# Patient Record
Sex: FEMALE | Race: WHITE | NOT HISPANIC OR LATINO | Employment: OTHER | URBAN - METROPOLITAN AREA
[De-identification: names, ages, dates, MRNs, and addresses within clinical notes are randomized per-mention and may not be internally consistent; named-entity substitution may affect disease eponyms.]

---

## 2023-11-02 ENCOUNTER — SOCIAL WORK (OUTPATIENT)
Dept: CASE MANAGEMENT | Facility: HOSPITAL | Age: 88
End: 2023-11-02
Payer: MEDICARE

## 2023-11-02 NOTE — PROGRESS NOTES
Social Work Note  11/2/2023 SW was requested by Dr. Garnica to talk with patient's son as he needed assistance with patient's disposition from hospice.  SW contacted son yesterday and reviewed the options for discharge from residential hospice to the community with hospice.  SW also encouraged him to have the social workers from both hospice and assisted living where his father is living to talk.  SW provided son with this writer's contact information.  SW will remain available to assist patient.  Christy Corbett, MSW, LSW

## 2023-11-13 ENCOUNTER — APPOINTMENT (OUTPATIENT)
Dept: RADIOLOGY | Facility: HOSPITAL | Age: 88
End: 2023-11-13
Payer: MEDICARE

## 2023-11-13 ENCOUNTER — HOSPITAL ENCOUNTER (EMERGENCY)
Facility: HOSPITAL | Age: 88
Discharge: HOSPICE | End: 2023-11-13
Attending: STUDENT IN AN ORGANIZED HEALTH CARE EDUCATION/TRAINING PROGRAM
Payer: MEDICARE

## 2023-11-13 VITALS
BODY MASS INDEX: 26.01 KG/M2 | DIASTOLIC BLOOD PRESSURE: 72 MMHG | RESPIRATION RATE: 16 BRPM | TEMPERATURE: 98.6 F | HEART RATE: 89 BPM | OXYGEN SATURATION: 99 % | SYSTOLIC BLOOD PRESSURE: 128 MMHG | WEIGHT: 137.79 LBS | HEIGHT: 61 IN
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
## 2023-11-13 DIAGNOSIS — S62.102A CLOSED FRACTURE OF LEFT WRIST, INITIAL ENCOUNTER: ICD-10-CM

## 2023-11-13 LAB
ALBUMIN SERPL BCP-MCNC: 3.1 G/DL (ref 3.4–5)
ALP SERPL-CCNC: 62 U/L (ref 33–136)
ALT SERPL W P-5'-P-CCNC: 8 U/L (ref 7–45)
ANION GAP SERPL CALC-SCNC: 11 MMOL/L (ref 10–20)
APPEARANCE UR: ABNORMAL
AST SERPL W P-5'-P-CCNC: 18 U/L (ref 9–39)
BASOPHILS # BLD AUTO: 0.04 X10*3/UL (ref 0–0.1)
BASOPHILS NFR BLD AUTO: 0.5 %
BILIRUB SERPL-MCNC: 0.8 MG/DL (ref 0–1.2)
BILIRUB UR STRIP.AUTO-MCNC: NEGATIVE MG/DL
BUN SERPL-MCNC: 9 MG/DL (ref 6–23)
CALCIUM SERPL-MCNC: 8.4 MG/DL (ref 8.6–10.3)
CARDIAC TROPONIN I PNL SERPL HS: 17 NG/L (ref 0–13)
CHLORIDE SERPL-SCNC: 97 MMOL/L (ref 98–107)
CO2 SERPL-SCNC: 30 MMOL/L (ref 21–32)
COLOR UR: ABNORMAL
CREAT SERPL-MCNC: 1.16 MG/DL (ref 0.5–1.05)
EOSINOPHIL # BLD AUTO: 0.14 X10*3/UL (ref 0–0.4)
EOSINOPHIL NFR BLD AUTO: 1.8 %
ERYTHROCYTE [DISTWIDTH] IN BLOOD BY AUTOMATED COUNT: 15.2 % (ref 11.5–14.5)
GFR SERPL CREATININE-BSD FRML MDRD: 45 ML/MIN/1.73M*2
GLUCOSE SERPL-MCNC: 101 MG/DL (ref 74–99)
GLUCOSE UR STRIP.AUTO-MCNC: NEGATIVE MG/DL
HCT VFR BLD AUTO: 38 % (ref 36–46)
HGB BLD-MCNC: 12.1 G/DL (ref 12–16)
HOLD SPECIMEN: NORMAL
IMM GRANULOCYTES # BLD AUTO: 0.03 X10*3/UL (ref 0–0.5)
IMM GRANULOCYTES NFR BLD AUTO: 0.4 % (ref 0–0.9)
KETONES UR STRIP.AUTO-MCNC: NEGATIVE MG/DL
LEUKOCYTE ESTERASE UR QL STRIP.AUTO: NEGATIVE
LYMPHOCYTES # BLD AUTO: 0.94 X10*3/UL (ref 0.8–3)
LYMPHOCYTES NFR BLD AUTO: 12.2 %
MCH RBC QN AUTO: 26.1 PG (ref 26–34)
MCHC RBC AUTO-ENTMCNC: 31.8 G/DL (ref 32–36)
MCV RBC AUTO: 82 FL (ref 80–100)
MONOCYTES # BLD AUTO: 0.62 X10*3/UL (ref 0.05–0.8)
MONOCYTES NFR BLD AUTO: 8.1 %
NEUTROPHILS # BLD AUTO: 5.92 X10*3/UL (ref 1.6–5.5)
NEUTROPHILS NFR BLD AUTO: 77 %
NITRITE UR QL STRIP.AUTO: NEGATIVE
NRBC BLD-RTO: 0 /100 WBCS (ref 0–0)
PH UR STRIP.AUTO: 5 [PH]
PLATELET # BLD AUTO: 148 X10*3/UL (ref 150–450)
POTASSIUM SERPL-SCNC: 3.9 MMOL/L (ref 3.5–5.3)
PROT SERPL-MCNC: 5.7 G/DL (ref 6.4–8.2)
PROT UR STRIP.AUTO-MCNC: ABNORMAL MG/DL
RBC # BLD AUTO: 4.64 X10*6/UL (ref 4–5.2)
RBC # UR STRIP.AUTO: NEGATIVE /UL
RBC #/AREA URNS AUTO: NORMAL /HPF
SODIUM SERPL-SCNC: 134 MMOL/L (ref 136–145)
SP GR UR STRIP.AUTO: 1.02
SQUAMOUS #/AREA URNS AUTO: NORMAL /HPF
UROBILINOGEN UR STRIP.AUTO-MCNC: <2 MG/DL
WBC # BLD AUTO: 7.7 X10*3/UL (ref 4.4–11.3)
WBC #/AREA URNS AUTO: NORMAL /HPF

## 2023-11-13 PROCEDURE — 70450 CT HEAD/BRAIN W/O DYE: CPT | Performed by: RADIOLOGY

## 2023-11-13 PROCEDURE — 73110 X-RAY EXAM OF WRIST: CPT | Mod: LEFT SIDE | Performed by: RADIOLOGY

## 2023-11-13 PROCEDURE — 29125 APPL SHORT ARM SPLINT STATIC: CPT | Mod: LT

## 2023-11-13 PROCEDURE — 96374 THER/PROPH/DIAG INJ IV PUSH: CPT | Mod: 59

## 2023-11-13 PROCEDURE — 93010 ELECTROCARDIOGRAM REPORT: CPT | Performed by: STUDENT IN AN ORGANIZED HEALTH CARE EDUCATION/TRAINING PROGRAM

## 2023-11-13 PROCEDURE — 84075 ASSAY ALKALINE PHOSPHATASE: CPT | Performed by: STUDENT IN AN ORGANIZED HEALTH CARE EDUCATION/TRAINING PROGRAM

## 2023-11-13 PROCEDURE — 73110 X-RAY EXAM OF WRIST: CPT | Mod: LT,FR

## 2023-11-13 PROCEDURE — 85025 COMPLETE CBC W/AUTO DIFF WBC: CPT | Performed by: STUDENT IN AN ORGANIZED HEALTH CARE EDUCATION/TRAINING PROGRAM

## 2023-11-13 PROCEDURE — 2500000004 HC RX 250 GENERAL PHARMACY W/ HCPCS (ALT 636 FOR OP/ED): Performed by: STUDENT IN AN ORGANIZED HEALTH CARE EDUCATION/TRAINING PROGRAM

## 2023-11-13 PROCEDURE — 72125 CT NECK SPINE W/O DYE: CPT | Performed by: RADIOLOGY

## 2023-11-13 PROCEDURE — 99285 EMERGENCY DEPT VISIT HI MDM: CPT | Performed by: STUDENT IN AN ORGANIZED HEALTH CARE EDUCATION/TRAINING PROGRAM

## 2023-11-13 PROCEDURE — 70450 CT HEAD/BRAIN W/O DYE: CPT

## 2023-11-13 PROCEDURE — 36415 COLL VENOUS BLD VENIPUNCTURE: CPT | Performed by: STUDENT IN AN ORGANIZED HEALTH CARE EDUCATION/TRAINING PROGRAM

## 2023-11-13 PROCEDURE — 81001 URINALYSIS AUTO W/SCOPE: CPT | Performed by: STUDENT IN AN ORGANIZED HEALTH CARE EDUCATION/TRAINING PROGRAM

## 2023-11-13 PROCEDURE — 99285 EMERGENCY DEPT VISIT HI MDM: CPT | Mod: 25 | Performed by: STUDENT IN AN ORGANIZED HEALTH CARE EDUCATION/TRAINING PROGRAM

## 2023-11-13 PROCEDURE — 72125 CT NECK SPINE W/O DYE: CPT

## 2023-11-13 PROCEDURE — 84484 ASSAY OF TROPONIN QUANT: CPT | Performed by: STUDENT IN AN ORGANIZED HEALTH CARE EDUCATION/TRAINING PROGRAM

## 2023-11-13 RX ORDER — OXYCODONE HYDROCHLORIDE 5 MG/1
5 TABLET ORAL EVERY 6 HOURS PRN
Qty: 15 TABLET | Refills: 0 | Status: SHIPPED | OUTPATIENT
Start: 2023-11-13 | End: 2023-11-20

## 2023-11-13 RX ORDER — FENTANYL CITRATE 50 UG/ML
25 INJECTION, SOLUTION INTRAMUSCULAR; INTRAVENOUS ONCE
Status: COMPLETED | OUTPATIENT
Start: 2023-11-13 | End: 2023-11-13

## 2023-11-13 RX ADMIN — FENTANYL CITRATE 25 MCG: 50 INJECTION, SOLUTION INTRAMUSCULAR; INTRAVENOUS at 11:55

## 2023-11-13 ASSESSMENT — PAIN SCALES - GENERAL
PAINLEVEL_OUTOF10: 2
PAINLEVEL_OUTOF10: 5 - MODERATE PAIN
PAINLEVEL_OUTOF10: 2
PAINLEVEL_OUTOF10: 3
PAINLEVEL_OUTOF10: 5 - MODERATE PAIN
PAINLEVEL_OUTOF10: 4
PAINLEVEL_OUTOF10: 2
PAINLEVEL_OUTOF10: 2

## 2023-11-13 ASSESSMENT — PAIN DESCRIPTION - LOCATION: LOCATION: WRIST

## 2023-11-13 ASSESSMENT — LIFESTYLE VARIABLES
EVER FELT BAD OR GUILTY ABOUT YOUR DRINKING: NO
EVER HAD A DRINK FIRST THING IN THE MORNING TO STEADY YOUR NERVES TO GET RID OF A HANGOVER: NO
HAVE YOU EVER FELT YOU SHOULD CUT DOWN ON YOUR DRINKING: NO
HAVE PEOPLE ANNOYED YOU BY CRITICIZING YOUR DRINKING: NO
REASON UNABLE TO ASSESS: NO

## 2023-11-13 ASSESSMENT — COLUMBIA-SUICIDE SEVERITY RATING SCALE - C-SSRS
2. HAVE YOU ACTUALLY HAD ANY THOUGHTS OF KILLING YOURSELF?: NO
1. IN THE PAST MONTH, HAVE YOU WISHED YOU WERE DEAD OR WISHED YOU COULD GO TO SLEEP AND NOT WAKE UP?: NO
6. HAVE YOU EVER DONE ANYTHING, STARTED TO DO ANYTHING, OR PREPARED TO DO ANYTHING TO END YOUR LIFE?: NO

## 2023-11-13 ASSESSMENT — PAIN - FUNCTIONAL ASSESSMENT
PAIN_FUNCTIONAL_ASSESSMENT: 0-10
PAIN_FUNCTIONAL_ASSESSMENT: 0-10

## 2023-11-13 ASSESSMENT — PAIN DESCRIPTION - ORIENTATION: ORIENTATION: LEFT

## 2023-11-13 NOTE — ED PROVIDER NOTES
HPI   Chief Complaint   Patient presents with    Fall       This is a 89-year-old female who presents to the emergency department from facility after a fall.  She has a history of known non-small cell lung cancer, and A-fib RVR who has been on hospice but has been doing so well that she is stating her last day on hospice is today.  She was transferring with assistance, when she fell.  Complaining of left wrist pain.  She did not lose consciousness.  She was on a blood thinner for her A-fib with RVR up until beginning hospice, but has not been on a blood thinner since then.  Denies losing consciousness, and denies nausea or vomiting.                          Wilmer Coma Scale Score: 15                  Patient History   Past Medical History:   Diagnosis Date    Personal history of other diseases of the musculoskeletal system and connective tissue     History of arthritis    Personal history of other diseases of urinary system     History of chronic kidney disease    Personal history of other endocrine, nutritional and metabolic disease     History of hypothyroidism    Personal history of other infectious and parasitic diseases     History of herpes zoster     Past Surgical History:   Procedure Laterality Date    OTHER SURGICAL HISTORY  06/10/2019    Cataract surgery    OTHER SURGICAL HISTORY  06/10/2019    Tonsillectomy    OTHER SURGICAL HISTORY  09/07/2021    Cardiac catheterization with stent placement     No family history on file.  Social History     Tobacco Use    Smoking status: Not on file    Smokeless tobacco: Not on file   Substance Use Topics    Alcohol use: Not on file    Drug use: Not on file       Physical Exam   ED Triage Vitals [11/13/23 1115]   Temp Heart Rate Resp BP   36.8 °C (98.2 °F) (!) 130 18 (!) 130/93      SpO2 Temp Source Heart Rate Source Patient Position   99 % Temporal Monitor --      BP Location FiO2 (%)     -- --       Physical Exam  Vitals and nursing note reviewed.   Constitutional:        General: She is not in acute distress.     Appearance: She is well-developed.      Comments: Frail elderly female   HENT:      Head: Normocephalic and atraumatic.   Eyes:      Conjunctiva/sclera: Conjunctivae normal.   Cardiovascular:      Rate and Rhythm: Normal rate and regular rhythm.      Heart sounds: No murmur heard.  Pulmonary:      Effort: Pulmonary effort is normal. No respiratory distress.      Breath sounds: Normal breath sounds.   Abdominal:      Palpations: Abdomen is soft.      Tenderness: There is no abdominal tenderness.   Musculoskeletal:         General: Deformity (Left wrist) present. No swelling.      Cervical back: Neck supple.   Skin:     General: Skin is warm and dry.      Capillary Refill: Capillary refill takes less than 2 seconds.   Neurological:      Mental Status: She is alert.   Psychiatric:         Mood and Affect: Mood normal.       ED Course & MDM   ED Course as of 11/14/23 0908 Mon Nov 13, 2023   1253 EKG independently interpreted by attending physician    1229 hrs.: Atrial fibrillation with RVR ventricular to 139 bpm.  QTc 523.  QRS 80.  Nonspecific ST and T wave abnormalities.  No acute injury pattern seen. [AI]      ED Course User Index  [AI] Zarina Cummings, DO         Diagnoses as of 11/14/23 0908   Fall, initial encounter   Closed fracture of left wrist, initial encounter       Medical Decision Making  History obtained from: Patient and EMS reports and chart review    External records reviewed: I reviewed external records including outpatient, PCP records, and prior discharge summaries    ED Course: Mild left wrist deformity, pulses and capillary refill intact in the hand.  Patient maintained in a c-collar.  Scan her head and C-spine, send basic labs and EKG. head and C-spine CTs show no acute fracture or dislocation.  C-spine cleared.  Left right wrist x-ray shows impacted fracture.  Placed in a sugar-tong splint.  She was in A-fib RVR, initially in the 130s up to 140s,  and there were some discussions regarding whether or not this patient was on hospice, the patient stated that today was her last day at the hospice facility, clarified with her son that she is moving from [facility] back to the assisted living facility where her 's house but that she will remain under hospice care.  They opted not to treat the Afib with RVR anymore. She is therefore discharged back to her facility with instructions to follow-up outpatient with orthopedic surgery for casting in the next several days.     I have reviewed this case with the ED attending physician, and the attending agrees with the plan. Patient or family was counselled regarding labs, imaging, likely diagnosis, and plan. All questions were answered.     Lalita Loera DO  PGY-4, emergency medicine    The above documentation was completed with the use of speech recognition software. It may contain dictation errors secondary to limitations of the software.    EKG obtained at 1229 and independently interpreted by ED provider shows atrial fibrillation with RVR, with normal axis, QRS of 80 ms, and QTc of 523.  Heart rate is in the 130s heart rate 139.  No signs of ischemia.  EKG was repeated at 1448, and independently interpreted by ED provider shows atrial fibrillation with RVR, heart rate 121, normal axis, QRS of 90 ms, QTc 491.  No signs of ischemia.    Sodium is 134 and her chloride is 97, creatinine slightly elevated at 1.16, no leukocytosis or signs of anemia, and her platelet count is 148.  She is urinalysis, with 2+ protein, but she no signs of nitrites or leukocyte esterase, or other findings concerning for UTI    Wrist fracture left distal radius identified on x-rays, and the left upper extremity placed in sugar-tong splint.           Procedure  Procedures     Lalita Loera DO  Resident  11/14/23 1578    The patient was seen by the resident/fellow.  I have personally performed a substantive portion of the encounter.  I  have seen and examined the patient; agree with the workup, evaluation, MDM, management and diagnosis.  The care plan has been discussed with the resident; I have reviewed the resident’s note and agree with the documented findings.           Zarina Cummings DO  11/14/23 1956

## 2023-11-13 NOTE — DISCHARGE INSTRUCTIONS
If you develop fevers, if the limb becomes hard, hot, swollen, red, or if it becomes cold and white or if you lose feeling, or if you become unable to move it, or if you develop tracking redness of the limb, please return to the emergency department immediately for reevaluation.    Otherwise please follow-up outpatient with the orthopedic surgeon in 3 days for a cast.

## 2023-11-13 NOTE — ED NOTES
Report called to Fiona at [facility]. Pt transport being set up for pt to return to them. Medic splinted arm.     Luciana Humphries RN  11/13/23 6472

## 2023-11-15 ENCOUNTER — HOSPITAL ENCOUNTER (OUTPATIENT)
Dept: CARDIOLOGY | Facility: HOSPITAL | Age: 88
Discharge: HOME | End: 2023-11-15
Payer: MEDICARE

## 2023-11-15 LAB
ATRIAL RATE: 119 BPM
ATRIAL RATE: 159 BPM
Q ONSET: 225 MS
Q ONSET: 225 MS
QRS COUNT: 20 BEATS
QRS COUNT: 23 BEATS
QRS DURATION: 80 MS
QRS DURATION: 90 MS
QT INTERVAL: 344 MS
QT INTERVAL: 346 MS
QTC CALCULATION(BAZETT): 491 MS
QTC CALCULATION(BAZETT): 523 MS
QTC FREDERICIA: 437 MS
QTC FREDERICIA: 455 MS
R AXIS: 43 DEGREES
R AXIS: 61 DEGREES
T AXIS: -67 DEGREES
T AXIS: 34 DEGREES
T OFFSET: 397 MS
T OFFSET: 398 MS
VENTRICULAR RATE: 121 BPM
VENTRICULAR RATE: 139 BPM

## 2023-11-15 PROCEDURE — 93005 ELECTROCARDIOGRAM TRACING: CPT

## 2023-11-16 ENCOUNTER — OFFICE VISIT (OUTPATIENT)
Dept: ORTHOPEDIC SURGERY | Facility: CLINIC | Age: 88
End: 2023-11-16
Payer: MEDICARE

## 2023-11-16 DIAGNOSIS — M25.532 LEFT WRIST PAIN: ICD-10-CM

## 2023-11-16 DIAGNOSIS — S52.502A CLOSED FRACTURE OF DISTAL END OF LEFT RADIUS, UNSPECIFIED FRACTURE MORPHOLOGY, INITIAL ENCOUNTER: ICD-10-CM

## 2023-11-16 PROCEDURE — L3984 UPPER EXT FX ORTHOSIS WRIST: HCPCS | Performed by: FAMILY MEDICINE

## 2023-11-16 NOTE — PROGRESS NOTES
Acute Injury New Patient Visit    CC:   Chief Complaint   Patient presents with    Left Wrist - Pain     Lt wrist comminuted fracture from fall 11/13/23  Xrays at        HPI: Kimberlyn is a 89 y.o.female who presents today with new complaints of left wrist pain status post a fall approximately 3 days ago.  She was seen evaluated in the emergency department where she was found to have a nondisplaced comminuted left distal radius fracture.  She is currently in hospice has a history of what I believed to be his lung cancer or bronchial cancer.  She is hoping to go home with home hospice in the relatively short future.  This injury has delayed her going home.        Review of Systems   GENERAL: Negative for malaise, significant weight loss, fever  MUSCULOSKELETAL: See HPI  NEURO: Negative for numbness / tingling     Past Medical History  Past Medical History:   Diagnosis Date    Personal history of other diseases of the musculoskeletal system and connective tissue     History of arthritis    Personal history of other diseases of urinary system     History of chronic kidney disease    Personal history of other endocrine, nutritional and metabolic disease     History of hypothyroidism    Personal history of other infectious and parasitic diseases     History of herpes zoster       Medication review  Medication Documentation Review Audit       Reviewed by Julia Antonio MA (Technologist) on 11/16/23 at 0837      Medication Order Taking? Sig Documenting Provider Last Dose Status   oxyCODONE (Roxicodone) 5 mg immediate release tablet [id number]  Take 1 tablet (5 mg) by mouth every 6 hours if needed for severe pain (7 - 10) for up to 7 days. Lalita Loera, DO  Active                    Allergies  Allergies   Allergen Reactions    Acetaminophen Unknown    Famotidine Other and Unknown    Hydrocodone Unknown    Hydrocodone-Acetaminophen Unknown    Oxybutynin Unknown    Tramadol Unknown    Ace Inhibitors Rash    Omeprazole Rash  and Unknown    Sulfamethoxazole-Trimethoprim Rash       Social History  Social History     Socioeconomic History    Marital status:      Spouse name: Not on file    Number of children: Not on file    Years of education: Not on file    Highest education level: Not on file   Occupational History    Not on file   Tobacco Use    Smoking status: Never    Smokeless tobacco: Never   Substance and Sexual Activity    Alcohol use: Not on file    Drug use: Not on file    Sexual activity: Not on file   Other Topics Concern    Not on file   Social History Narrative    Not on file     Social Determinants of Health     Financial Resource Strain: Not on file   Food Insecurity: Not on file   Transportation Needs: Not on file   Physical Activity: Not on file   Stress: Not on file   Social Connections: Not on file   Intimate Partner Violence: Not on file   Housing Stability: Not on file       Surgical History  Past Surgical History:   Procedure Laterality Date    OTHER SURGICAL HISTORY  06/10/2019    Cataract surgery    OTHER SURGICAL HISTORY  06/10/2019    Tonsillectomy    OTHER SURGICAL HISTORY  09/07/2021    Cardiac catheterization with stent placement       Physical Exam:  GENERAL:  Patient is awake, alert, and oriented to person place and time.  Patient appears well nourished and well kept.  Affect Calm, Not Acutely Distressed.  HEENT:  Normocephalic, Atraumatic, EOMI  CARDIOVASCULAR:  Hemodynamically stable.  RESPIRATORY:  Normal respirations with unlabored breathing.  NEURO: Neuro  Extremity: Left wrist exam demonstrates skin which is warm pink well-perfused tenderness palpation over the distal radius no ulnar-sided pain she can give a thumbs up and okay sign limited wrist flexion wrist extension.  Forearm compartment soft compressible elbow is nontender.  No open cuts wounds or sores.  No redness or erythema.  No snuffbox pain.      Diagnostics: X-rays reviewed consistent with nondisplaced comminuted fracture as  discussed with the patient.           Procedure: None  Procedures    Assessment:   Problem List Items Addressed This Visit    None  Visit Diagnoses       Left wrist pain        Closed fracture of distal end of left radius, unspecified fracture morphology, initial encounter        Relevant Orders    Exos Short Arm Fx Brace             Plan: Discussed the nonoperative nature of the injury with the patient at the bedside.  She will be provided with a removable fracture brace today.  We discussed not providing her with a cast due to her current level of hospice care.  We will plan on seeing her back if able in 6 weeks for repeat evaluation.  If her return is not possible recommended that she maintain in the brace as tolerated until the first of the year.  They may call or return sooner with any issues.  Patient is on hospice care and we will certainly not be providing any surgical services or any invasive treatments.  She may continue with her pain medications per hospice protocols.  Orders Placed This Encounter    Exos Short Arm Fx Brace      At the conclusion of the visit there were no further questions by the patient/family regarding their plan of care.  Patient was instructed to call or return with any issues, questions, or concerns regarding their injury and/or treatment plan described above.     11/16/23 at 6:51 PM - Cole C Budinsky, MD    Office: [phone]    This note was prepared using voice recognition software.  The details of this note are correct and have been reviewed, and corrected to the best of my ability.  Some grammatical errors may persist related to the Dragon software.